# Patient Record
Sex: FEMALE | Race: WHITE | URBAN - METROPOLITAN AREA
[De-identification: names, ages, dates, MRNs, and addresses within clinical notes are randomized per-mention and may not be internally consistent; named-entity substitution may affect disease eponyms.]

---

## 2022-09-16 ENCOUNTER — OFFICE VISIT (OUTPATIENT)
Dept: URBAN - METROPOLITAN AREA CLINIC 30 | Facility: CLINIC | Age: 77
End: 2022-09-16
Payer: MEDICARE

## 2022-09-16 DIAGNOSIS — H00.024 HORDEOLUM INTERNUM LEFT UPPER LID: ICD-10-CM

## 2022-09-16 DIAGNOSIS — H25.813 COMBINED FORMS OF AGE-RELATED CATARACT, BILATERAL: Primary | ICD-10-CM

## 2022-09-16 DIAGNOSIS — S06.0X0S CONCUSSION WITHOUT LOSS OF CONSCIOUSNESS, SEQUELA: ICD-10-CM

## 2022-09-16 DIAGNOSIS — H43.393 OTHER VITREOUS OPACITIES, BILATERAL: ICD-10-CM

## 2022-09-16 DIAGNOSIS — H00.021 HORDEOLUM INTERNUM RIGHT UPPER LID: ICD-10-CM

## 2022-09-16 DIAGNOSIS — H35.372 PUCKERING OF MACULA, LEFT EYE: ICD-10-CM

## 2022-09-16 PROCEDURE — 92134 CPTRZ OPH DX IMG PST SGM RTA: CPT | Performed by: OPTOMETRIST

## 2022-09-16 PROCEDURE — 99204 OFFICE O/P NEW MOD 45 MIN: CPT | Performed by: OPTOMETRIST

## 2022-09-16 ASSESSMENT — KERATOMETRY
OD: 43.66
OS: 42.97

## 2022-09-16 ASSESSMENT — VISUAL ACUITY
OD: 20/50
OS: 20/40

## 2022-09-16 ASSESSMENT — INTRAOCULAR PRESSURE
OD: 17
OS: 17

## 2022-09-16 NOTE — IMPRESSION/PLAN
Impression: Hordeolum internum right upper lid: H00.021. Plan: Start WC's qhs OU. consider Erythromycin/ Maxitrol len qhs OU to the lids.

## 2022-09-16 NOTE — IMPRESSION/PLAN
Impression: Puckering of macula, left eye: H35.372. Plan: Mild. Continue to monitor. No symptoms of metamorphopsia. RTC if symptoms change. See MAC OCT.

## 2022-09-16 NOTE — IMPRESSION/PLAN
Impression: Combined forms of age-related cataract, bilateral: H25.813. Plan: Degree of cataracts do not appear to correlate c decreased BCVA. Will recommend baseline 24-2 to r/o other etiologies.

## 2022-09-16 NOTE — IMPRESSION/PLAN
Impression: Concussion without loss of consciousness, sequela: S06.0x0S. Plan: Hx of, pt fell a few years ago. Will order VF 24-2.

## 2022-10-24 ENCOUNTER — OFFICE VISIT (OUTPATIENT)
Dept: URBAN - METROPOLITAN AREA CLINIC 30 | Facility: CLINIC | Age: 77
End: 2022-10-24
Payer: MEDICARE

## 2022-10-24 DIAGNOSIS — S06.0X0S CONCUSSION WITHOUT LOSS OF CONSCIOUSNESS, SEQUELA: Primary | ICD-10-CM

## 2022-10-24 DIAGNOSIS — H25.813 COMBINED FORMS OF AGE-RELATED CATARACT, BILATERAL: ICD-10-CM

## 2022-10-24 DIAGNOSIS — H52.4 PRESBYOPIA: ICD-10-CM

## 2022-10-24 PROCEDURE — 92025 CPTRIZED CORNEAL TOPOGRAPHY: CPT | Performed by: OPTOMETRIST

## 2022-10-24 PROCEDURE — 92083 EXTENDED VISUAL FIELD XM: CPT | Performed by: OPTOMETRIST

## 2022-10-24 PROCEDURE — 99214 OFFICE O/P EST MOD 30 MIN: CPT | Performed by: OPTOMETRIST

## 2022-10-24 ASSESSMENT — VISUAL ACUITY
OD: 20/40
OS: 20/30

## 2022-10-24 ASSESSMENT — INTRAOCULAR PRESSURE
OD: 19
OS: 21

## 2022-10-24 ASSESSMENT — KERATOMETRY
OS: 43.30
OD: 43.87

## 2022-10-24 NOTE — IMPRESSION/PLAN
Impression: Concussion without loss of consciousness, sequela: S06.0x0S. Plan: Hx of concussion several yrs ago. Monitor.

## 2022-10-24 NOTE — IMPRESSION/PLAN
Impression: Combined forms of age-related cataract, bilateral: H25.813. Plan: Degree of cataracts do not appear to correlate c decreased BCVA. 10/2022 24-2 Baseline VF OD Early NS; OS scattered defects. 
10/2022 Karel documentation

## 2023-10-23 ENCOUNTER — OFFICE VISIT (OUTPATIENT)
Dept: URBAN - METROPOLITAN AREA CLINIC 30 | Facility: CLINIC | Age: 78
End: 2023-10-23
Payer: MEDICARE

## 2023-10-23 DIAGNOSIS — H00.024 HORDEOLUM INTERNUM LEFT UPPER LID: ICD-10-CM

## 2023-10-23 DIAGNOSIS — H43.393 OTHER VITREOUS OPACITIES, BILATERAL: ICD-10-CM

## 2023-10-23 DIAGNOSIS — S06.0X0S CONCUSSION WITHOUT LOSS OF CONSCIOUSNESS, SEQUELA: ICD-10-CM

## 2023-10-23 DIAGNOSIS — H00.021 HORDEOLUM INTERNUM RIGHT UPPER LID: ICD-10-CM

## 2023-10-23 DIAGNOSIS — H35.372 PUCKERING OF MACULA, LEFT EYE: ICD-10-CM

## 2023-10-23 DIAGNOSIS — H25.813 COMBINED FORMS OF AGE-RELATED CATARACT, BILATERAL: Primary | ICD-10-CM

## 2023-10-23 PROCEDURE — 99213 OFFICE O/P EST LOW 20 MIN: CPT | Performed by: OPTOMETRIST

## 2023-10-23 PROCEDURE — 92134 CPTRZ OPH DX IMG PST SGM RTA: CPT | Performed by: OPTOMETRIST

## 2023-10-23 ASSESSMENT — KERATOMETRY
OD: 43.87
OS: 43.44

## 2023-10-23 ASSESSMENT — VISUAL ACUITY
OD: 20/25
OS: 20/25

## 2023-10-23 ASSESSMENT — INTRAOCULAR PRESSURE
OD: 22
OS: 23

## 2024-10-21 ENCOUNTER — OFFICE VISIT (OUTPATIENT)
Dept: URBAN - METROPOLITAN AREA CLINIC 30 | Facility: CLINIC | Age: 79
End: 2024-10-21
Payer: MEDICARE

## 2024-10-21 DIAGNOSIS — H25.813 COMBINED FORMS OF AGE-RELATED CATARACT, BILATERAL: Primary | ICD-10-CM

## 2024-10-21 DIAGNOSIS — H35.372 PUCKERING OF MACULA, LEFT EYE: ICD-10-CM

## 2024-10-21 DIAGNOSIS — H43.393 OTHER VITREOUS OPACITIES, BILATERAL: ICD-10-CM

## 2024-10-21 DIAGNOSIS — S06.0X0S CONCUSSION WITHOUT LOSS OF CONSCIOUSNESS, SEQUELA: ICD-10-CM

## 2024-10-21 PROCEDURE — 92134 CPTRZ OPH DX IMG PST SGM RTA: CPT | Performed by: OPTOMETRIST

## 2024-10-21 PROCEDURE — 92014 COMPRE OPH EXAM EST PT 1/>: CPT | Performed by: OPTOMETRIST

## 2024-10-21 ASSESSMENT — INTRAOCULAR PRESSURE
OD: 22
OS: 20

## 2024-10-21 ASSESSMENT — VISUAL ACUITY
OD: 20/30
OS: 20/30

## 2024-10-21 ASSESSMENT — KERATOMETRY
OD: 43.63
OS: 43.63